# Patient Record
Sex: MALE | Race: WHITE | NOT HISPANIC OR LATINO | Employment: OTHER | URBAN - METROPOLITAN AREA
[De-identification: names, ages, dates, MRNs, and addresses within clinical notes are randomized per-mention and may not be internally consistent; named-entity substitution may affect disease eponyms.]

---

## 2023-03-07 LAB — PROSTATE SPECIFIC AG (NG/ML) IN SER/PLAS: 2.89 NG/ML (ref 0–4)

## 2023-05-04 PROBLEM — N40.0 ENLARGED PROSTATE: Status: ACTIVE | Noted: 2023-05-04

## 2023-05-04 PROBLEM — N40.0 BPH (BENIGN PROSTATIC HYPERPLASIA): Status: ACTIVE | Noted: 2023-05-04

## 2023-05-04 PROBLEM — R05.9 COUGH: Status: ACTIVE | Noted: 2023-05-04

## 2023-05-04 PROBLEM — R60.0 EDEMA LEG: Status: ACTIVE | Noted: 2023-05-04

## 2023-05-04 PROBLEM — R35.1 NOCTURIA: Status: ACTIVE | Noted: 2023-05-04

## 2023-05-04 PROBLEM — K21.9 GERD (GASTROESOPHAGEAL REFLUX DISEASE): Status: ACTIVE | Noted: 2023-05-04

## 2023-05-04 PROBLEM — R22.41 SUBCUTANEOUS MASS OF RIGHT LOWER LEG: Status: ACTIVE | Noted: 2023-05-04

## 2023-05-04 PROBLEM — N52.9 ERECTILE DYSFUNCTION: Status: ACTIVE | Noted: 2023-05-04

## 2023-05-04 PROBLEM — R97.20 ELEVATED PSA: Status: ACTIVE | Noted: 2023-05-04

## 2023-05-04 PROBLEM — I49.3 PVC (PREMATURE VENTRICULAR CONTRACTION): Status: ACTIVE | Noted: 2023-05-04

## 2023-05-04 PROBLEM — L30.9 ECZEMA: Status: ACTIVE | Noted: 2023-05-04

## 2023-05-04 RX ORDER — TRIAMCINOLONE ACETONIDE 1 MG/G
CREAM TOPICAL 3 TIMES DAILY
COMMUNITY
End: 2023-05-05 | Stop reason: SDUPTHER

## 2023-05-04 RX ORDER — DUTASTERIDE AND TAMSULOSIN HYDROCHLORIDE CAPSULES .5; .4 MG/1; MG/1
1 CAPSULE ORAL DAILY
COMMUNITY
Start: 2020-01-15 | End: 2023-08-30 | Stop reason: SDUPTHER

## 2023-05-04 RX ORDER — METOPROLOL SUCCINATE 25 MG/1
1 TABLET, EXTENDED RELEASE ORAL DAILY
COMMUNITY
End: 2023-08-30

## 2023-05-04 RX ORDER — HYDROCHLOROTHIAZIDE 25 MG/1
1 TABLET ORAL DAILY
COMMUNITY
Start: 2021-01-26 | End: 2023-08-30

## 2023-05-04 RX ORDER — OMEPRAZOLE 20 MG/1
1 CAPSULE, DELAYED RELEASE ORAL DAILY
COMMUNITY
End: 2023-08-30

## 2023-05-04 RX ORDER — TADALAFIL 20 MG/1
1 TABLET ORAL DAILY PRN
COMMUNITY

## 2023-05-05 ENCOUNTER — LAB (OUTPATIENT)
Dept: LAB | Facility: LAB | Age: 83
End: 2023-05-05
Payer: MEDICARE

## 2023-05-05 ENCOUNTER — OFFICE VISIT (OUTPATIENT)
Dept: PRIMARY CARE | Facility: CLINIC | Age: 83
End: 2023-05-05
Payer: MEDICARE

## 2023-05-05 VITALS
HEIGHT: 71 IN | WEIGHT: 220.4 LBS | OXYGEN SATURATION: 98 % | DIASTOLIC BLOOD PRESSURE: 60 MMHG | BODY MASS INDEX: 30.85 KG/M2 | HEART RATE: 46 BPM | SYSTOLIC BLOOD PRESSURE: 102 MMHG

## 2023-05-05 DIAGNOSIS — N40.1 BENIGN PROSTATIC HYPERPLASIA WITH NOCTURIA: ICD-10-CM

## 2023-05-05 DIAGNOSIS — R60.0 EDEMA LEG: ICD-10-CM

## 2023-05-05 DIAGNOSIS — Z00.00 ROUTINE GENERAL MEDICAL EXAMINATION AT HEALTH CARE FACILITY: Primary | ICD-10-CM

## 2023-05-05 DIAGNOSIS — K21.9 GASTROESOPHAGEAL REFLUX DISEASE WITHOUT ESOPHAGITIS: ICD-10-CM

## 2023-05-05 DIAGNOSIS — R35.1 BENIGN PROSTATIC HYPERPLASIA WITH NOCTURIA: ICD-10-CM

## 2023-05-05 DIAGNOSIS — I49.3 PVC (PREMATURE VENTRICULAR CONTRACTION): ICD-10-CM

## 2023-05-05 DIAGNOSIS — L30.9 ECZEMA, UNSPECIFIED TYPE: ICD-10-CM

## 2023-05-05 DIAGNOSIS — L24.7 CONTACT DERMATITIS AND ECZEMA DUE TO PLANT: ICD-10-CM

## 2023-05-05 PROBLEM — R05.9 COUGH: Status: RESOLVED | Noted: 2023-05-04 | Resolved: 2023-05-05

## 2023-05-05 PROBLEM — R14.2 GASEOUS REGURGITATION: Status: RESOLVED | Noted: 2022-05-11 | Resolved: 2023-05-05

## 2023-05-05 PROBLEM — R13.14 PHARYNGOESOPHAGEAL DYSPHAGIA: Status: RESOLVED | Noted: 2022-05-11 | Resolved: 2023-05-05

## 2023-05-05 PROBLEM — N40.0 ENLARGED PROSTATE: Status: RESOLVED | Noted: 2023-05-04 | Resolved: 2023-05-05

## 2023-05-05 PROBLEM — R09.A2 GLOBUS SENSATION: Status: ACTIVE | Noted: 2022-05-11

## 2023-05-05 PROBLEM — R13.14 PHARYNGOESOPHAGEAL DYSPHAGIA: Status: ACTIVE | Noted: 2022-05-11

## 2023-05-05 PROBLEM — R12 HEARTBURN: Status: ACTIVE | Noted: 2022-05-11

## 2023-05-05 PROBLEM — R09.A2 GLOBUS SENSATION: Status: RESOLVED | Noted: 2022-05-11 | Resolved: 2023-05-05

## 2023-05-05 PROBLEM — R14.2 GASEOUS REGURGITATION: Status: ACTIVE | Noted: 2022-05-11

## 2023-05-05 PROBLEM — R22.41 SUBCUTANEOUS MASS OF RIGHT LOWER LEG: Status: RESOLVED | Noted: 2023-05-04 | Resolved: 2023-05-05

## 2023-05-05 LAB
ALANINE AMINOTRANSFERASE (SGPT) (U/L) IN SER/PLAS: 24 U/L (ref 10–52)
ALBUMIN (G/DL) IN SER/PLAS: 4.2 G/DL (ref 3.4–5)
ALKALINE PHOSPHATASE (U/L) IN SER/PLAS: 67 U/L (ref 33–136)
ANION GAP IN SER/PLAS: 10 MMOL/L (ref 10–20)
ASPARTATE AMINOTRANSFERASE (SGOT) (U/L) IN SER/PLAS: 24 U/L (ref 9–39)
BILIRUBIN TOTAL (MG/DL) IN SER/PLAS: 0.8 MG/DL (ref 0–1.2)
CALCIUM (MG/DL) IN SER/PLAS: 9.5 MG/DL (ref 8.6–10.3)
CARBON DIOXIDE, TOTAL (MMOL/L) IN SER/PLAS: 31 MMOL/L (ref 21–32)
CHLORIDE (MMOL/L) IN SER/PLAS: 102 MMOL/L (ref 98–107)
CREATININE (MG/DL) IN SER/PLAS: 0.91 MG/DL (ref 0.5–1.3)
ERYTHROCYTE DISTRIBUTION WIDTH (RATIO) BY AUTOMATED COUNT: 13.2 % (ref 11.5–14.5)
ERYTHROCYTE MEAN CORPUSCULAR HEMOGLOBIN CONCENTRATION (G/DL) BY AUTOMATED: 32.4 G/DL (ref 32–36)
ERYTHROCYTE MEAN CORPUSCULAR VOLUME (FL) BY AUTOMATED COUNT: 96 FL (ref 80–100)
ERYTHROCYTES (10*6/UL) IN BLOOD BY AUTOMATED COUNT: 5.03 X10E12/L (ref 4.5–5.9)
GFR MALE: 84 ML/MIN/1.73M2
GLUCOSE (MG/DL) IN SER/PLAS: 93 MG/DL (ref 74–99)
HEMATOCRIT (%) IN BLOOD BY AUTOMATED COUNT: 48.4 % (ref 41–52)
HEMOGLOBIN (G/DL) IN BLOOD: 15.7 G/DL (ref 13.5–17.5)
LEUKOCYTES (10*3/UL) IN BLOOD BY AUTOMATED COUNT: 5.6 X10E9/L (ref 4.4–11.3)
PLATELETS (10*3/UL) IN BLOOD AUTOMATED COUNT: 225 X10E9/L (ref 150–450)
POTASSIUM (MMOL/L) IN SER/PLAS: 4.5 MMOL/L (ref 3.5–5.3)
PROTEIN TOTAL: 6.9 G/DL (ref 6.4–8.2)
SODIUM (MMOL/L) IN SER/PLAS: 138 MMOL/L (ref 136–145)
UREA NITROGEN (MG/DL) IN SER/PLAS: 15 MG/DL (ref 6–23)

## 2023-05-05 PROCEDURE — 80053 COMPREHEN METABOLIC PANEL: CPT

## 2023-05-05 PROCEDURE — 36415 COLL VENOUS BLD VENIPUNCTURE: CPT

## 2023-05-05 PROCEDURE — 1160F RVW MEDS BY RX/DR IN RCRD: CPT | Performed by: FAMILY MEDICINE

## 2023-05-05 PROCEDURE — 1036F TOBACCO NON-USER: CPT | Performed by: FAMILY MEDICINE

## 2023-05-05 PROCEDURE — 99214 OFFICE O/P EST MOD 30 MIN: CPT | Performed by: FAMILY MEDICINE

## 2023-05-05 PROCEDURE — 1170F FXNL STATUS ASSESSED: CPT | Performed by: FAMILY MEDICINE

## 2023-05-05 PROCEDURE — 85027 COMPLETE CBC AUTOMATED: CPT

## 2023-05-05 PROCEDURE — 1159F MED LIST DOCD IN RCRD: CPT | Performed by: FAMILY MEDICINE

## 2023-05-05 PROCEDURE — G0439 PPPS, SUBSEQ VISIT: HCPCS | Performed by: FAMILY MEDICINE

## 2023-05-05 RX ORDER — MULTIVITAMIN
1 TABLET ORAL
COMMUNITY
Start: 2012-03-29

## 2023-05-05 RX ORDER — TRIAMCINOLONE ACETONIDE 1 MG/G
CREAM TOPICAL 3 TIMES DAILY
Qty: 80 G | Refills: 11 | Status: SHIPPED | OUTPATIENT
Start: 2023-05-05 | End: 2024-05-04

## 2023-05-05 ASSESSMENT — ACTIVITIES OF DAILY LIVING (ADL)
DRESSING: INDEPENDENT
TAKING_MEDICATION: INDEPENDENT
DOING_HOUSEWORK: INDEPENDENT
BATHING: INDEPENDENT
MANAGING_FINANCES: INDEPENDENT
GROCERY_SHOPPING: INDEPENDENT

## 2023-05-05 ASSESSMENT — ENCOUNTER SYMPTOMS
OCCASIONAL FEELINGS OF UNSTEADINESS: 0
DEPRESSION: 0
LOSS OF SENSATION IN FEET: 0

## 2023-05-05 ASSESSMENT — PATIENT HEALTH QUESTIONNAIRE - PHQ9
2. FEELING DOWN, DEPRESSED OR HOPELESS: NOT AT ALL
SUM OF ALL RESPONSES TO PHQ9 QUESTIONS 1 AND 2: 0
1. LITTLE INTEREST OR PLEASURE IN DOING THINGS: NOT AT ALL

## 2023-05-05 NOTE — PROGRESS NOTES
"Subjective   Reason for Visit: Chad Hartmann is an 83 y.o. male here for a Medicare Wellness visit.     Past Medical, Surgical, and Family History reviewed and updated in chart.    Reviewed all medications by prescribing practitioner or clinical pharmacist (such as prescriptions, OTCs, herbal therapies and supplements) and documented in the medical record.    HPI  Since the last office visit there have been no interval operations, hospitalizations, important illnesses or injuries.  GERD-Takes PPI daily with no breakthrough symptoms.  Reviewed dietary, caffeine, tobacco, alcohol, and NSAID avoidance. No dyspepsia, dysphagia, reflux, melena, or abdominal pain.  Edema of the leg is managed with hydrochlorothiazide successfully  PVCs are suppressed with metoprolol succinate to his satisfaction.  He is asymptomatic  He has eczema which she uses as needed triamcinolone.  80 g tube should last year  BPH with transient elevation of PSA has resolved but correcting for being on dutasteride he would be around 6.  ED on Cialis both of which are followed by urology annually    Occ riight knee hurts takes otc, prev arthroscope for meniiscus about 20 yrs. 2 a day prn tylenol, reviewed dosage guidelines and if continues to be problem injection can be provided      Patient Care Team:  Mina Feldman MD as PCP - General  Mina Feldman MD as PCP - [employer] PCP     Review of Systems general-no fatigue weight to within 10 pounds  ENT no problems with vision swallowing  Cardiac no chest pains palpitations change in exercise tolerance or capacity  Pulmonary no cough shortness of breath  GI no heartburn or abdominal pain  Musculoskeletal few joint pains    Objective   Vitals:  /60   Pulse (!) 46   Ht 1.803 m (5' 11\")   Wt 100 kg (220 lb 6.4 oz)   SpO2 98%   BMI 30.74 kg/m²       Physical Exam  General:  Alert, No acute distress. Appears stated age  Eye:  Pupils are equal, round and reactive to light, " Extraocular movements are intact, Normal conjunctiva.    Neck:  Supple, Non-tender, No carotid bruit, No jugular venous distention, No lymphadenopathy, No thyromegaly.    Respiratory:  Lungs are clear to auscultation, Respirations are non-labored, Breath sounds are equal.    Cardiovascular:  Normal rate, Regular rhythm, No murmur.    Gastrointestinal:  Soft, Non-tender, No organomegaly. No solid or pulsatile mass  Integumentary:  Warm, Dry. No concerning lesions on exposed areas  Neurologic:  Alert, Oriented.  Gross and fine motor intact, CN 2-12 intact  Psychiatric:  Cooperative, Appropriate mood & affect.  Assessment/Plan   Problem List Items Addressed This Visit          Circulatory    PVC (premature ventricular contraction)    Relevant Orders    Follow Up In Primary Care       Digestive    GERD (gastroesophageal reflux disease)    Relevant Orders    Follow Up In Primary Care       Genitourinary    BPH (benign prostatic hyperplasia)       Musculoskeletal    Edema leg    Relevant Orders    CBC    Comprehensive Metabolic Panel    Follow Up In Primary Care       Infectious/Inflammatory    Eczema     Other Visit Diagnoses       Routine general medical examination at health care facility    -  Primary    Relevant Orders    Follow Up In Primary Care    Contact dermatitis and eczema due to plant        Relevant Medications    triamcinolone (Kenalog) 0.1 % cream

## 2023-08-21 DIAGNOSIS — R60.0 EDEMA LEG: Primary | ICD-10-CM

## 2023-08-21 DIAGNOSIS — N40.1 BENIGN PROSTATIC HYPERPLASIA WITH NOCTURIA: ICD-10-CM

## 2023-08-21 DIAGNOSIS — K21.9 GASTROESOPHAGEAL REFLUX DISEASE WITHOUT ESOPHAGITIS: ICD-10-CM

## 2023-08-21 DIAGNOSIS — R35.1 BENIGN PROSTATIC HYPERPLASIA WITH NOCTURIA: ICD-10-CM

## 2023-08-30 RX ORDER — DUTASTERIDE AND TAMSULOSIN HYDROCHLORIDE CAPSULES .5; .4 MG/1; MG/1
1 CAPSULE ORAL DAILY
Qty: 90 CAPSULE | Refills: 3 | Status: SHIPPED | OUTPATIENT
Start: 2023-08-30 | End: 2024-08-29

## 2023-08-30 RX ORDER — HYDROCHLOROTHIAZIDE 25 MG/1
25 TABLET ORAL DAILY
Qty: 90 TABLET | Refills: 3 | Status: SHIPPED | OUTPATIENT
Start: 2023-08-30

## 2023-08-30 RX ORDER — OMEPRAZOLE 20 MG/1
20 CAPSULE, DELAYED RELEASE ORAL DAILY
Qty: 90 CAPSULE | Refills: 3 | Status: SHIPPED | OUTPATIENT
Start: 2023-08-30

## 2023-08-30 RX ORDER — METOPROLOL SUCCINATE 25 MG/1
25 TABLET, EXTENDED RELEASE ORAL DAILY
Qty: 90 TABLET | Refills: 3 | Status: SHIPPED | OUTPATIENT
Start: 2023-08-30

## 2023-09-14 LAB — PROSTATE SPECIFIC AG (NG/ML) IN SER/PLAS: 3.44 NG/ML (ref 0–4)

## 2024-01-08 ENCOUNTER — APPOINTMENT (OUTPATIENT)
Dept: PRIMARY CARE | Facility: CLINIC | Age: 84
End: 2024-01-08
Payer: MEDICARE

## 2024-10-02 DIAGNOSIS — R60.0 EDEMA LEG: ICD-10-CM

## 2024-10-02 DIAGNOSIS — K21.9 GASTROESOPHAGEAL REFLUX DISEASE WITHOUT ESOPHAGITIS: ICD-10-CM

## 2024-10-15 RX ORDER — HYDROCHLOROTHIAZIDE 25 MG/1
25 TABLET ORAL DAILY
Qty: 30 TABLET | Refills: 0 | Status: SHIPPED | OUTPATIENT
Start: 2024-10-15 | End: 2025-10-15

## 2024-10-15 RX ORDER — OMEPRAZOLE 20 MG/1
20 CAPSULE, DELAYED RELEASE ORAL DAILY
Qty: 30 CAPSULE | Refills: 0 | Status: SHIPPED | OUTPATIENT
Start: 2024-10-15 | End: 2025-10-15

## 2024-10-22 ENCOUNTER — APPOINTMENT (OUTPATIENT)
Age: 84
End: 2024-10-22
Payer: MEDICARE

## 2024-10-22 ENCOUNTER — TELEPHONE (OUTPATIENT)
Age: 84
End: 2024-10-22

## 2024-10-22 VITALS
OXYGEN SATURATION: 98 % | HEIGHT: 70 IN | WEIGHT: 221 LBS | BODY MASS INDEX: 31.64 KG/M2 | HEART RATE: 56 BPM | SYSTOLIC BLOOD PRESSURE: 122 MMHG | DIASTOLIC BLOOD PRESSURE: 80 MMHG

## 2024-10-22 DIAGNOSIS — Z12.5 SCREENING FOR PROSTATE CANCER: ICD-10-CM

## 2024-10-22 DIAGNOSIS — R60.0 EDEMA LEG: ICD-10-CM

## 2024-10-22 DIAGNOSIS — Z00.00 ROUTINE GENERAL MEDICAL EXAMINATION AT HEALTH CARE FACILITY: Primary | ICD-10-CM

## 2024-10-22 DIAGNOSIS — K21.9 GASTROESOPHAGEAL REFLUX DISEASE WITHOUT ESOPHAGITIS: ICD-10-CM

## 2024-10-22 PROCEDURE — G0439 PPPS, SUBSEQ VISIT: HCPCS | Performed by: FAMILY MEDICINE

## 2024-10-22 PROCEDURE — 1159F MED LIST DOCD IN RCRD: CPT | Performed by: FAMILY MEDICINE

## 2024-10-22 PROCEDURE — 1124F ACP DISCUSS-NO DSCNMKR DOCD: CPT | Performed by: FAMILY MEDICINE

## 2024-10-22 PROCEDURE — 99213 OFFICE O/P EST LOW 20 MIN: CPT | Performed by: FAMILY MEDICINE

## 2024-10-22 PROCEDURE — 1170F FXNL STATUS ASSESSED: CPT | Performed by: FAMILY MEDICINE

## 2024-10-22 PROCEDURE — 1036F TOBACCO NON-USER: CPT | Performed by: FAMILY MEDICINE

## 2024-10-22 PROCEDURE — 1160F RVW MEDS BY RX/DR IN RCRD: CPT | Performed by: FAMILY MEDICINE

## 2024-10-22 RX ORDER — METOPROLOL SUCCINATE 25 MG/1
25 TABLET, EXTENDED RELEASE ORAL DAILY
Qty: 90 TABLET | Refills: 3 | Status: SHIPPED | OUTPATIENT
Start: 2024-10-22

## 2024-10-22 RX ORDER — OMEPRAZOLE 20 MG/1
20 CAPSULE, DELAYED RELEASE ORAL DAILY
Qty: 90 CAPSULE | Refills: 3 | Status: SHIPPED | OUTPATIENT
Start: 2024-10-22 | End: 2025-10-22

## 2024-10-22 RX ORDER — HYDROCHLOROTHIAZIDE 25 MG/1
25 TABLET ORAL DAILY
Qty: 90 TABLET | Refills: 3 | Status: CANCELLED | OUTPATIENT
Start: 2024-10-22 | End: 2025-10-22

## 2024-10-22 RX ORDER — FUROSEMIDE 40 MG/1
40 TABLET ORAL DAILY
Qty: 90 TABLET | Refills: 3 | Status: SHIPPED | OUTPATIENT
Start: 2024-10-22 | End: 2025-10-22

## 2024-10-22 ASSESSMENT — PATIENT HEALTH QUESTIONNAIRE - PHQ9
1. LITTLE INTEREST OR PLEASURE IN DOING THINGS: NOT AT ALL
SUM OF ALL RESPONSES TO PHQ9 QUESTIONS 1 AND 2: 0
2. FEELING DOWN, DEPRESSED OR HOPELESS: NOT AT ALL

## 2024-10-22 ASSESSMENT — ENCOUNTER SYMPTOMS
DEPRESSION: 0
LOSS OF SENSATION IN FEET: 0
OCCASIONAL FEELINGS OF UNSTEADINESS: 0

## 2024-10-22 ASSESSMENT — ACTIVITIES OF DAILY LIVING (ADL)
MANAGING_FINANCES: INDEPENDENT
GROCERY_SHOPPING: INDEPENDENT
DRESSING: INDEPENDENT
DOING_HOUSEWORK: INDEPENDENT
BATHING: INDEPENDENT
TAKING_MEDICATION: INDEPENDENT

## 2024-10-22 NOTE — ASSESSMENT & PLAN NOTE
Orders:    omeprazole (PriLOSEC) 20 mg DR capsule; Take 1 capsule (20 mg) by mouth once daily.    Follow Up In Primary Care; Future

## 2024-10-22 NOTE — PROGRESS NOTES
"Subjective   Reason for Visit: Chad Hartmann is an 84 y.o. male here for a Medicare Wellness visit.     Past Medical, Surgical, and Family History reviewed and updated in chart.    Reviewed all medications by prescribing practitioner or clinical pharmacist (such as prescriptions, OTCs, herbal therapies and supplements) and documented in the medical record.    HPI  Since the last office visit there have been no interval operations, hospitalizations, important illnesses or injuries.  GERD-Takes PPI every other daily with no breakthrough symptoms.  Reviewed dietary, caffeine, tobacco, alcohol, and NSAID avoidance.  With a nagging cough reviewed allergy versus reflux and will start with having take Claritin for 1 month along with taking omeprazole daily.  If not improved consider ENT referral to visualize cords  Reviewed differential for edema of legs.  Chronic hypertension likely cause.  DC HCTZ resume diuretic with Lasix 40 RBA need for lab reviewed  Patient Care Team:  Mina Feldman MD as PCP - General  Mina Feldman MD as PCP - [employer] PCP     Review of Systems  General-no fatigue weight to within 10 pounds  ENT no problems with vision swallowing  Cardiac no chest pains palpitations change in exercise tolerance or capacity  Pulmonary no cough shortness of breath  GI no heartburn or abdominal pain  Musculoskeletal no joint pains    Objective   Vitals:  /80   Pulse 56   Ht 1.778 m (5' 10\")   Wt 100 kg (221 lb)   SpO2 98%   BMI 31.71 kg/m²       Physical Exam  General:  Alert, No acute distress. Appears stated age  Eye:  Pupils are equal, round and reactive to light, Extraocular movements are intact, Normal conjunctiva.    Neck:  Supple, Non-tender, No carotid bruit, No jugular venous distention, No lymphadenopathy, No thyromegaly.    Respiratory:  Lungs are clear to auscultation, Respirations are non-labored, Breath sounds are equal.    Cardiovascular:  Normal rate, Regular rhythm, " No murmur.    Gastrointestinal:  Soft, Non-tender, No organomegaly. No solid or pulsatile mass  Integumentary:  Warm, Dry. No concerning lesions on exposed areas  Neurologic:  Alert, Oriented.  Gross and fine motor intact, CN 2-12 intact  Psychiatric:  Cooperative, Appropriate mood & affect.    Assessment & Plan  Routine general medical examination at health care facility    Orders:    1 Year Follow Up In Primary Care - Wellness Exam; Future    Edema leg    Orders:    metoprolol succinate XL (Toprol-XL) 25 mg 24 hr tablet; Take 1 tablet (25 mg) by mouth once daily.    CBC; Future    Comprehensive Metabolic Panel; Future    furosemide (Lasix) 40 mg tablet; Take 1 tablet (40 mg) by mouth once daily.    Follow Up In Primary Care; Future    Gastroesophageal reflux disease without esophagitis    Orders:    omeprazole (PriLOSEC) 20 mg DR capsule; Take 1 capsule (20 mg) by mouth once daily.    Follow Up In Primary Care; Future    Screening for prostate cancer    Orders:    Prostate Specific Antigen, Screen; Future    Follow Up In Primary Care; Future

## 2024-10-22 NOTE — TELEPHONE ENCOUNTER
Patient left without checking out, tried calling twice to schedule follow-up, phone just rang busy.

## 2024-10-22 NOTE — ASSESSMENT & PLAN NOTE
Orders:    metoprolol succinate XL (Toprol-XL) 25 mg 24 hr tablet; Take 1 tablet (25 mg) by mouth once daily.    CBC; Future    Comprehensive Metabolic Panel; Future    furosemide (Lasix) 40 mg tablet; Take 1 tablet (40 mg) by mouth once daily.    Follow Up In Primary Care; Future

## 2024-11-11 ENCOUNTER — LAB (OUTPATIENT)
Facility: LAB | Age: 84
End: 2024-11-11
Payer: MEDICARE

## 2024-11-11 DIAGNOSIS — R60.0 EDEMA LEG: ICD-10-CM

## 2024-11-11 DIAGNOSIS — Z12.5 SCREENING FOR PROSTATE CANCER: ICD-10-CM

## 2024-11-11 LAB
ALBUMIN SERPL BCP-MCNC: 4.1 G/DL (ref 3.4–5)
ALP SERPL-CCNC: 71 U/L (ref 33–136)
ALT SERPL W P-5'-P-CCNC: 21 U/L (ref 10–52)
ANION GAP SERPL CALC-SCNC: 9 MMOL/L (ref 10–20)
AST SERPL W P-5'-P-CCNC: 20 U/L (ref 9–39)
BILIRUB SERPL-MCNC: 0.7 MG/DL (ref 0–1.2)
BUN SERPL-MCNC: 14 MG/DL (ref 6–23)
CALCIUM SERPL-MCNC: 9.6 MG/DL (ref 8.6–10.3)
CHLORIDE SERPL-SCNC: 103 MMOL/L (ref 98–107)
CO2 SERPL-SCNC: 32 MMOL/L (ref 21–32)
CREAT SERPL-MCNC: 0.94 MG/DL (ref 0.5–1.3)
EGFRCR SERPLBLD CKD-EPI 2021: 80 ML/MIN/1.73M*2
ERYTHROCYTE [DISTWIDTH] IN BLOOD BY AUTOMATED COUNT: 13.1 % (ref 11.5–14.5)
GLUCOSE SERPL-MCNC: 82 MG/DL (ref 74–99)
HCT VFR BLD AUTO: 45.8 % (ref 41–52)
HGB BLD-MCNC: 15.2 G/DL (ref 13.5–17.5)
MCH RBC QN AUTO: 31.7 PG (ref 26–34)
MCHC RBC AUTO-ENTMCNC: 33.2 G/DL (ref 32–36)
MCV RBC AUTO: 96 FL (ref 80–100)
NRBC BLD-RTO: 0 /100 WBCS (ref 0–0)
PLATELET # BLD AUTO: 255 X10*3/UL (ref 150–450)
POTASSIUM SERPL-SCNC: 4.3 MMOL/L (ref 3.5–5.3)
PROT SERPL-MCNC: 6.9 G/DL (ref 6.4–8.2)
PSA SERPL-MCNC: 4.73 NG/ML
RBC # BLD AUTO: 4.79 X10*6/UL (ref 4.5–5.9)
SODIUM SERPL-SCNC: 140 MMOL/L (ref 136–145)
WBC # BLD AUTO: 8.2 X10*3/UL (ref 4.4–11.3)

## 2024-11-11 PROCEDURE — 85027 COMPLETE CBC AUTOMATED: CPT

## 2024-11-11 PROCEDURE — 80053 COMPREHEN METABOLIC PANEL: CPT

## 2024-11-11 PROCEDURE — 36415 COLL VENOUS BLD VENIPUNCTURE: CPT

## 2024-11-11 PROCEDURE — G0103 PSA SCREENING: HCPCS

## 2024-11-12 ENCOUNTER — TELEPHONE (OUTPATIENT)
Age: 84
End: 2024-11-12
Payer: MEDICARE

## 2024-11-12 NOTE — TELEPHONE ENCOUNTER
----- Message from Mina Feldman sent at 11/11/2024  6:30 PM EST -----  Let patient know that his PSA has come down remarkably from 7.19 down to 4.73.  That is very positive    Pt notified.

## 2025-01-17 DIAGNOSIS — N40.1 BENIGN PROSTATIC HYPERPLASIA WITH LOWER URINARY TRACT SYMPTOMS, SYMPTOM DETAILS UNSPECIFIED: ICD-10-CM

## 2025-01-17 RX ORDER — DUTASTERIDE AND TAMSULOSIN HYDROCHLORIDE CAPSULES .5; .4 MG/1; MG/1
1 CAPSULE ORAL DAILY
Qty: 30 CAPSULE | Refills: 11 | Status: SHIPPED | OUTPATIENT
Start: 2025-01-17 | End: 2025-01-22 | Stop reason: SDUPTHER

## 2025-01-21 ASSESSMENT — ENCOUNTER SYMPTOMS
PSYCHIATRIC NEGATIVE: 1
NAUSEA: 0
COUGH: 0
DIFFICULTY URINATING: 0
ENDOCRINE NEGATIVE: 1
ALLERGIC/IMMUNOLOGIC NEGATIVE: 1
FEVER: 0
SHORTNESS OF BREATH: 0
CHILLS: 0
EYES NEGATIVE: 1

## 2025-01-21 NOTE — PROGRESS NOTES
Virtual or Telephone Consent    An interactive audio and video telecommunication system which permits real time communications between the patient (at the originating site) and provider (at the distant site) was utilized to provide this telehealth service.   Verbal consent was requested and obtained from Chad Hartmann on this date, 01/22/25 for a telehealth visit.     Subjective   Patient ID: Chad Hartmann is a 84 y.o. male.    HPI  Patient has chronic hx of elevated PSA....Most recent PSA was 4.73 on 11/24. Prior PSA was.... 3.44 on 9/23. Prior PSA was 2.89 on 3/23. Prior PSA was 7.19 on 11/22. Prior PSA was 3.21 on 11/21..Prior PSA was 3.17 on 5/21..Previous PSA was done on 01/21 and was a 7.51, prior was done on 1/20 and was 3.12...prior PSA was 3.65 on 12/2018.....Prior PSA was 4.28 on 12/2017 and 4.30 on 9/2016..... No family hx of prostate ca. ..3 negative TRUS Bx. HE HAS NOT HAD AN MRI... Chronic BPH sx are mild and stable with Tx...some urgency and frequency...weak stream at times..No dysuria... No hematuria..Nocturia 1-2x...caffeine does worsen sx.. Pt takes Gala daily and is pleased. Patient has been on this for 6-7 years..ED is chronic. Pt. takes Cialis PRN                Review of Systems   Constitutional:  Negative for chills and fever.   HENT: Negative.     Eyes: Negative.    Respiratory:  Negative for cough and shortness of breath.    Cardiovascular:  Negative for chest pain and leg swelling.   Gastrointestinal:  Negative for nausea.   Endocrine: Negative.    Genitourinary:  Negative for difficulty urinating.        Negative except for documented in HPI   Allergic/Immunologic: Negative.    Neurological:         Alert & oriented X 3   Hematological:         Denies blood thinners   Psychiatric/Behavioral: Negative.         Objective   Physical Exam  No PE done do to the nature of appointment  Assessment/Plan   There are no diagnoses linked to this encounter.      All available PSA values reviewed,  Options discussed. Questions answered.  Discussed MRI  Pros and cons of prostate biopsy reviewed. Other options discussed. Questions answered  Past Bx reviewed   Diet changes for prostate health discussed and educational information given. Pros/Cons of prostate health supplements discussed.   Treatment options for LUTS reviewed  Avodart Rx given  Discussed timed voiding. Discussed fluid and caffeine intake  Treatment options for ED reviewed.  Lifestyle change to help prevent UTIs discussed. Encouraged fluid intake.    F/U 5/25 with PSA virtual

## 2025-01-22 ENCOUNTER — APPOINTMENT (OUTPATIENT)
Dept: UROLOGY | Facility: CLINIC | Age: 85
End: 2025-01-22
Payer: MEDICARE

## 2025-01-22 DIAGNOSIS — N52.9 ERECTILE DYSFUNCTION, UNSPECIFIED ERECTILE DYSFUNCTION TYPE: ICD-10-CM

## 2025-01-22 DIAGNOSIS — R35.1 NOCTURIA: ICD-10-CM

## 2025-01-22 DIAGNOSIS — N40.1 BENIGN PROSTATIC HYPERPLASIA WITH LOWER URINARY TRACT SYMPTOMS, SYMPTOM DETAILS UNSPECIFIED: ICD-10-CM

## 2025-01-22 DIAGNOSIS — R97.20 ELEVATED PSA: ICD-10-CM

## 2025-01-22 PROCEDURE — 1036F TOBACCO NON-USER: CPT | Performed by: UROLOGY

## 2025-01-22 PROCEDURE — 99214 OFFICE O/P EST MOD 30 MIN: CPT | Performed by: UROLOGY

## 2025-01-22 PROCEDURE — 1159F MED LIST DOCD IN RCRD: CPT | Performed by: UROLOGY

## 2025-01-22 RX ORDER — DUTASTERIDE AND TAMSULOSIN HYDROCHLORIDE CAPSULES .5; .4 MG/1; MG/1
1 CAPSULE ORAL DAILY
Qty: 30 CAPSULE | Refills: 11 | Status: SHIPPED | OUTPATIENT
Start: 2025-01-22 | End: 2025-01-23

## 2025-01-23 RX ORDER — DUTASTERIDE AND TAMSULOSIN HYDROCHLORIDE CAPSULES .5; .4 MG/1; MG/1
1 CAPSULE ORAL DAILY
Qty: 90 CAPSULE | Refills: 11 | Status: SHIPPED | OUTPATIENT
Start: 2025-01-23 | End: 2025-02-22

## 2025-04-24 LAB — PSA SERPL-MCNC: 2.63 NG/ML

## 2025-06-10 ENCOUNTER — HOSPITAL ENCOUNTER (OUTPATIENT)
Dept: RADIOLOGY | Facility: HOSPITAL | Age: 85
Discharge: HOME | End: 2025-06-10
Payer: MEDICARE

## 2025-06-10 ENCOUNTER — APPOINTMENT (OUTPATIENT)
Age: 85
End: 2025-06-10
Payer: MEDICARE

## 2025-06-10 VITALS
SYSTOLIC BLOOD PRESSURE: 104 MMHG | BODY MASS INDEX: 30.28 KG/M2 | HEART RATE: 65 BPM | WEIGHT: 211 LBS | OXYGEN SATURATION: 98 % | DIASTOLIC BLOOD PRESSURE: 70 MMHG

## 2025-06-10 DIAGNOSIS — M79.89 CALF SWELLING: Primary | ICD-10-CM

## 2025-06-10 DIAGNOSIS — M79.89 CALF SWELLING: ICD-10-CM

## 2025-06-10 PROCEDURE — 99214 OFFICE O/P EST MOD 30 MIN: CPT | Performed by: FAMILY MEDICINE

## 2025-06-10 PROCEDURE — 1159F MED LIST DOCD IN RCRD: CPT | Performed by: FAMILY MEDICINE

## 2025-06-10 PROCEDURE — 73590 X-RAY EXAM OF LOWER LEG: CPT | Mod: RIGHT SIDE | Performed by: RADIOLOGY

## 2025-06-10 PROCEDURE — 1160F RVW MEDS BY RX/DR IN RCRD: CPT | Performed by: FAMILY MEDICINE

## 2025-06-10 PROCEDURE — 1036F TOBACCO NON-USER: CPT | Performed by: FAMILY MEDICINE

## 2025-06-10 PROCEDURE — 73590 X-RAY EXAM OF LOWER LEG: CPT | Mod: RT

## 2025-06-10 NOTE — PROGRESS NOTES
Subjective   Patient ID: Chad Hartmann is a 85 y.o. male who presents for calf swelling (Right, x2wks, no injury. ER-5/31/25).    HPI   2wks right calf swelling.  Was evaluated in the ER 5/31/2025 and yesterday 6/9/2025 by cardiology.  He had an echocardiogram and Doppler of the calf done in the last couple weeks.  It is negative for blood clot.  Cardiology increased his Lasix from 40 twice daily to 80 AM and 40 p.m. with monitoring of the BMP.  He noted no injury but 2 weeks ago he began with swelling of the right calf, it was painful, in the ER he was treated as cellulitis.  He is known to have chronic venous changes distally but this enlarging painful swelling over the back of the cath is new.  The swelling seems to go down slightly overnight.  Pain with walking.  Discussed with radiologist in regard to go with MRI contrast.  Differential includes a torn gastroc.  Soft tissue tumor has to be considered but being painful seems less likely.  Review of Systems  No fever chills nausea vomiting or systemic symptoms  Objective   /70   Pulse 65   Wt 95.7 kg (211 lb)   SpO2 98%   BMI 30.28 kg/m²     Physical Exam  Heart regular.  Lungs clear.  Pedal pulses are faintly felt the distal third of the leg with chronic venous hyperpigmentation changes.  The calf shows a globular mass  more than 8 cm across.  The calf circumference is over 50 cm at a point 10 cm below the tibial tubercle on the right whereas the left is under 45 cm.  Assessment/Plan   Problem List Items Addressed This Visit    None  Visit Diagnoses         Codes      Calf swelling    -  Primary M79.89    Relevant Orders    MR tibia fibula right w and wo IV contrast

## 2025-06-11 ENCOUNTER — HOSPITAL ENCOUNTER (OUTPATIENT)
Dept: RADIOLOGY | Facility: HOSPITAL | Age: 85
End: 2025-06-11
Payer: MEDICARE

## 2025-06-13 ENCOUNTER — TELEPHONE (OUTPATIENT)
Age: 85
End: 2025-06-13
Payer: MEDICARE

## 2025-06-13 NOTE — TELEPHONE ENCOUNTER
FYI: Spoke to [employer] appeals department, they stated our appeal got filed twice, they voided out the duplicate request but the original request they received on 06/12/2025 has been approved via the medical director and clinical reviewer team. Patient scheduled for MRI on 06/16/2025 at 1:30 and is aware of date and time. Authorization information received and referral has been updated so no [id number] needed at this time ([id number]!).

## 2025-06-13 NOTE — TELEPHONE ENCOUNTER
I will get you the [id number] number and any other information you may need. Just see me Monday.

## 2025-06-16 ENCOUNTER — HOSPITAL ENCOUNTER (OUTPATIENT)
Dept: RADIOLOGY | Facility: HOSPITAL | Age: 85
Discharge: HOME | End: 2025-06-16
Payer: MEDICARE

## 2025-06-16 DIAGNOSIS — M79.89 CALF SWELLING: ICD-10-CM

## 2025-06-16 PROCEDURE — 2550000001 HC RX 255 CONTRASTS: Performed by: FAMILY MEDICINE

## 2025-06-16 PROCEDURE — 73720 MRI LWR EXTREMITY W/O&W/DYE: CPT | Mod: RT

## 2025-06-16 PROCEDURE — A9575 INJ GADOTERATE MEGLUMI 0.1ML: HCPCS | Performed by: FAMILY MEDICINE

## 2025-06-16 RX ORDER — GADOTERATE MEGLUMINE 376.9 MG/ML
20 INJECTION INTRAVENOUS
Status: COMPLETED | OUTPATIENT
Start: 2025-06-16 | End: 2025-06-16

## 2025-06-16 RX ADMIN — GADOTERATE MEGLUMINE 20 ML: 376.9 INJECTION INTRAVENOUS at 15:28

## 2025-06-17 ENCOUNTER — TELEPHONE (OUTPATIENT)
Age: 85
End: 2025-06-17
Payer: MEDICARE

## 2025-06-17 DIAGNOSIS — L03.115 CELLULITIS OF RIGHT LOWER EXTREMITY: Primary | ICD-10-CM

## 2025-06-17 RX ORDER — SULFAMETHOXAZOLE AND TRIMETHOPRIM 800; 160 MG/1; MG/1
1 TABLET ORAL 2 TIMES DAILY
Qty: 20 TABLET | Refills: 0 | Status: SHIPPED | OUTPATIENT
Start: 2025-06-17 | End: 2025-06-27

## 2025-06-17 NOTE — TELEPHONE ENCOUNTER
----- Message from Mina Feldman sent at 6/17/2025  9:00 AM EDT -----  The MRI showed that it is a cellulitis, and infection of the skin on the leg.  There is no other explanation for the swelling.  The report says that some of the muscles have some atrophy and tearing   but this is not what the swelling is from, it is felt to be from infection so I have sent in sulfa trimethoprim twice daily to [facility] for a 10-day supply  ----- Message -----  From: Interface, Radiology Results In  Sent: 6/16/2025   4:10 PM EDT  To: Mina Feldman MD    Pt notified and confirmed understanding.